# Patient Record
Sex: MALE | HISPANIC OR LATINO | Employment: FULL TIME | URBAN - METROPOLITAN AREA
[De-identification: names, ages, dates, MRNs, and addresses within clinical notes are randomized per-mention and may not be internally consistent; named-entity substitution may affect disease eponyms.]

---

## 2018-01-08 ENCOUNTER — HOSPITAL ENCOUNTER (EMERGENCY)
Facility: CLINIC | Age: 53
Discharge: HOME | End: 2018-01-08
Attending: EMERGENCY MEDICINE | Admitting: EMERGENCY MEDICINE
Payer: COMMERCIAL

## 2018-01-08 ENCOUNTER — APPOINTMENT (OUTPATIENT)
Dept: GENERAL RADIOLOGY | Facility: CLINIC | Age: 53
End: 2018-01-08
Attending: EMERGENCY MEDICINE
Payer: COMMERCIAL

## 2018-01-08 VITALS
WEIGHT: 163 LBS | SYSTOLIC BLOOD PRESSURE: 142 MMHG | RESPIRATION RATE: 18 BRPM | DIASTOLIC BLOOD PRESSURE: 78 MMHG | OXYGEN SATURATION: 96 % | TEMPERATURE: 98.6 F

## 2018-01-08 DIAGNOSIS — J18.9 PNEUMONIA OF RIGHT LUNG DUE TO INFECTIOUS ORGANISM, UNSPECIFIED PART OF LUNG: ICD-10-CM

## 2018-01-08 LAB
FLUAV+FLUBV AG SPEC QL: NEGATIVE
FLUAV+FLUBV AG SPEC QL: NEGATIVE
SPECIMEN SOURCE: NORMAL

## 2018-01-08 PROCEDURE — 71046 X-RAY EXAM CHEST 2 VIEWS: CPT

## 2018-01-08 PROCEDURE — 25000132 ZZH RX MED GY IP 250 OP 250 PS 637: Performed by: EMERGENCY MEDICINE

## 2018-01-08 PROCEDURE — 99283 EMERGENCY DEPT VISIT LOW MDM: CPT | Mod: Z6 | Performed by: EMERGENCY MEDICINE

## 2018-01-08 PROCEDURE — 99283 EMERGENCY DEPT VISIT LOW MDM: CPT | Mod: 25 | Performed by: EMERGENCY MEDICINE

## 2018-01-08 PROCEDURE — 87804 INFLUENZA ASSAY W/OPTIC: CPT | Mod: 91 | Performed by: FAMILY MEDICINE

## 2018-01-08 RX ORDER — IBUPROFEN 800 MG/1
800 TABLET, FILM COATED ORAL EVERY 8 HOURS PRN
Qty: 60 TABLET | Refills: 0 | Status: SHIPPED | OUTPATIENT
Start: 2018-01-08 | End: 2018-01-16

## 2018-01-08 RX ORDER — LEVOFLOXACIN 500 MG/1
500 TABLET, FILM COATED ORAL DAILY
Qty: 7 TABLET | Refills: 0 | Status: SHIPPED | OUTPATIENT
Start: 2018-01-08 | End: 2018-01-15

## 2018-01-08 RX ORDER — IBUPROFEN 600 MG/1
600 TABLET, FILM COATED ORAL ONCE
Status: COMPLETED | OUTPATIENT
Start: 2018-01-08 | End: 2018-01-08

## 2018-01-08 RX ADMIN — IBUPROFEN 600 MG: 600 TABLET ORAL at 02:36

## 2018-01-08 ASSESSMENT — ENCOUNTER SYMPTOMS
WHEEZING: 0
SHORTNESS OF BREATH: 1
RHINORRHEA: 1
COUGH: 1
SORE THROAT: 0
DIAPHORESIS: 0
MYALGIAS: 1
HEADACHES: 1
WEIGHT LOSS: 0
SINUS CONGESTION: 1
EYE DISCHARGE: 0
CHILLS: 1
FEVER: 1

## 2018-01-08 NOTE — ED AVS SNAPSHOT
[facility], [place], Emergency Department    [address]    [facility] [place] [address]    Phone:  [phone]    Fax:  [phone]                                       Michael Nazario   MRN: [id number]    Department:  [facility], Emergency Department   Date of Visit:  1/8/2018           After Visit Summary Signature Page     I have received my discharge instructions, and my questions have been answered. I have discussed any challenges I see with this plan with the nurse or doctor.    ..........................................................................................................................................  Patient/Patient Representative Signature      ..........................................................................................................................................  Patient Representative Print Name and Relationship to Patient    ..................................................               ................................................  Date                                            Time    ..........................................................................................................................................  Reviewed by Signature/Title    ...................................................              ..............................................  Date                                                            Time

## 2018-01-08 NOTE — ED AVS SNAPSHOT
[facility], Emergency Department    [address]    [facility] [address]    Phone:  [phone]    Fax:  [phone]                                       Michael Nazario   MRN: [id number]    Department:  [facility], Emergency Department   Date of Visit:  1/8/2018           Patient Information     Date Of Birth          1965        Your diagnoses for this visit were:     Pneumonia of right lung due to infectious organism, unspecified part of lung        You were seen by Poli Pickering MD.      Follow-up Information     Follow up with [facility] In 2 days.    Specialty:  Clinic    Contact information:    [address]  [place] [address]  [phone]          Discharge Instructions         Pneumonia (Adult)  Pneumonia is an infection deep within the lungs. It is in the small air sacs (alveoli). Pneumonia may be caused by a virus or bacteria. Pneumonia caused by bacteria is usually treated with an antibiotic. Severe cases may need to be treated in the hospital. Milder cases can be treated at home. Symptoms usually start to get better during the first 2 days of treatment.    Home care  Follow these guidelines when caring for yourself at home:    Rest at home for the first 2 to 3 days, or until you feel stronger. Don t let yourself get overly tired when you go back to your activities.    Stay away from cigarette smoke - yours or other people s.    You may use acetaminophen or ibuprofen to control fever or pain, unless another medicine was prescribed. If you have chronic liver or kidney disease, talk with your healthcare provider before using these medicines. Also talk with your provider if you ve had a stomach ulcer or gastrointestinal bleeding. Don t give aspirin to anyone younger than 18 years of age who is ill with a fever. It may cause severe liver damage.    Your appetite may be poor, so a light diet is fine.    Drink 6 to 8 glasses of fluids every day to make sure you  are getting enough fluids. Beverages can include water, sport drinks, sodas without caffeine, juices, tea, or soup. Fluids will help loosen secretions in the lung. This will make it easier for you to cough up the phlegm (sputum). If you also have heart or kidney disease, check with your healthcare provider before you drink extra fluids.    Take antibiotic medicine prescribed until it is all gone, even if you are feeling better after a few days.  Follow-up care  Follow up with your healthcare provider in the next 2 to 3 days, or as advised. This is to be sure the medicine is helping you get better.  If you are 65 or older, you should get a pneumococcal vaccine and a yearly flu (influenza) shot. You should also get these vaccines if you have chronic lung disease like asthma, emphysema, or COPD. Recently, a second type of pneumonia vaccine has become available for everyone over 65 years old. This is in addition to the previous vaccine. Ask your provider about this.  When to seek medical advice  Call your healthcare provider right away if any of these occur:    You don t get better within the first 48 hours of treatment    Shortness of breath gets worse    Rapid breathing (more than 25 breaths per minute)    Coughing up blood    Chest pain gets worse with breathing    Fever of 100.4 F (38 C) or higher that doesn t get better with fever medicine    Weakness, dizziness, or fainting that gets worse    Thirst or dry mouth that gets worse    Sinus pain, headache, or a stiff neck    Chest pain not caused by coughing  Date Last Reviewed: 1/1/2017 2000-2017 The "[employer] [address] [place] [address], [place] [address]. All rights reserved. This information is not intended as a substitute for professional medical care. Always follow your healthcare professional's instructions.          24 Hour Appointment Hotline       To make an appointment at any [facility], call [phone] ([phone]). If you don't  have a family doctor or clinic, we will help you find one. [facility] clinics are conveniently located to serve the needs of you and your family.             Review of your medicines      START taking        Dose / Directions Last dose taken    ibuprofen 800 MG tablet   Commonly known as:  ADVIL/MOTRIN   Dose:  800 mg   Quantity:  60 tablet        Take 1 tablet (800 mg) by mouth every 8 hours as needed for moderate pain   Refills:  0        levofloxacin 500 MG tablet   Commonly known as:  LEVAQUIN   Dose:  500 mg   Quantity:  7 tablet        Take 1 tablet (500 mg) by mouth daily for 7 days   Refills:  0                Prescriptions were sent or printed at these locations (2 Prescriptions)                   Other Prescriptions                Printed at Department/Unit printer (2 of 2)         ibuprofen (ADVIL/MOTRIN) 800 MG tablet               levofloxacin (LEVAQUIN) 500 MG tablet                Procedures and tests performed during your visit     Influenza A/B antigen swab    XR Chest 2 Views      Orders Needing Specimen Collection     None      Pending Results     No orders found from 1/6/2018 to 1/9/2018.            Pending Culture Results     No orders found from 1/6/2018 to 1/9/2018.            Pending Results Instructions     If you had any lab results that were not finalized at the time of your Discharge, you can call the ED Lab Result RN at [phone]. You will be contacted by this team for any positive Lab results or changes in treatment. The nurses are available 7 days a week from 10A to 6:30P.  You can leave a message 24 hours per day and they will return your call.        Thank you for choosing [facility]       Thank you for choosing [facility] for your care. Our goal is always to provide you with excellent care. Hearing back from our patients is one way we can continue to improve our services. Please take a few minutes to complete the written survey that you may receive in the mail after you visit with us.  "Thank you!        [employer] Information     [employer] lets you send messages to your doctor, view your test results, renew your prescriptions, schedule appointments and more. To sign up, go to [email or web address] . Click on \"Log in\" on the left side of the screen, which will take you to the Welcome page. Then click on \"Sign up Now\" on the right side of the page.     You will be asked to enter the access code listed below, as well as some personal information. Please follow the directions to create your username and password.     Your access code is: [email or web address]  Expires: 2018  3:25 AM     Your access code will  in 90 days. If you need help or a new code, please call your [facility] clinic or [phone].        Care EveryWhere ID     This is your Care EveryWhere ID. This could be used by other organizations to access your [facility] medical records  [id number]        Equal Access to Services     [facility] : Hadii gaurav gerardoo Sokalyn, waaxda luerinadaha, qaybta kaalmaarmen johnson, rafael vallecillo . So [facility] [phone].    ATENCIÓN: Si habla español, tiene a leos disposición servicios gratuitos de asistencia lingüística. Llame al [phone].    We comply with applicable federal civil rights laws and [place] laws. We do not discriminate on the basis of race, color, national origin, age, disability, sex, sexual orientation, or gender identity.            After Visit Summary       This is your record. Keep this with you and show to your community pharmacist(s) and doctor(s) at your next visit.                  "

## 2018-01-08 NOTE — LETTER
Return to  Work Release    Date: 1/8/2018      Name: Michael Nazario                       YOB: 1965    Medical Record Number: [id number]    The patient was seen at: [facility]    Restrictions if any: None    Resume Activity: In 3 days.        _________________________  Poli Pickering MD

## 2018-01-08 NOTE — ED PROVIDER NOTES
History     Chief Complaint   Patient presents with     Flu Symptoms     Per patient, began approximately 7 day ago with nasal congestion and cough     Patient is a 52 year old male presenting with cough. The history is provided by the patient. No  was used.   Cough   Cough characteristics:  Non-productive, dry and hacking  Sputum characteristics:  Unable to specify  Severity:  Moderate  Onset quality:  Gradual  Timing:  Constant  Progression:  Worsening  Chronicity: 5 days.  Smoker: no    Context: upper respiratory infection    Context: not sick contacts    Worsened by:  Activity  Ineffective treatments:  Decongestant and cough suppressants  Associated symptoms: chills, fever, headaches, myalgias, rhinorrhea, shortness of breath ( fro 1 day) and sinus congestion    Associated symptoms: no chest pain, no diaphoresis, no ear pain, no eye discharge, no rash, no sore throat, no weight loss and no wheezing    Fever:     Duration:  5 weeks    Temp source:  Lucia Nazario is a 52 year old male who cough and sob    I have reviewed the Medications, Allergies, Past Medical and Surgical History, and Social History in the Epic system.    Review of Systems   Constitutional: Positive for chills and fever. Negative for diaphoresis and weight loss.   HENT: Positive for rhinorrhea. Negative for ear pain and sore throat.    Eyes: Negative for discharge.   Respiratory: Positive for cough and shortness of breath ( fro 1 day). Negative for wheezing.    Cardiovascular: Negative for chest pain.   Musculoskeletal: Positive for myalgias.   Skin: Negative for rash.   Neurological: Positive for headaches.   All other systems reviewed and are negative.      Physical Exam   BP: 142/78  Heart Rate: 93  Temp: 98.6  F (37  C)  Resp: 18  Weight: 73.9 kg (163 lb)  SpO2: 96 %      Physical Exam   Constitutional: He is oriented to person, place, and time. He appears well-developed and well-nourished. No distress.    HENT:   Head: Normocephalic and atraumatic.   Right Ear: External ear normal.   Left Ear: External ear normal.   Mouth/Throat: Oropharynx is clear and moist.   Eyes: Conjunctivae are normal. Pupils are equal, round, and reactive to light. Right eye exhibits no discharge. Left eye exhibits no discharge. No scleral icterus.   Neck: Normal range of motion.   Cardiovascular: Normal rate, regular rhythm, normal heart sounds and intact distal pulses.    No murmur heard.  Pulmonary/Chest: Effort normal. No stridor. No respiratory distress. He has no wheezes. Rales:  right base. He exhibits no tenderness.   Abdominal: Soft. There is no tenderness.   Musculoskeletal: Normal range of motion. He exhibits no edema.   Lymphadenopathy:     He has no cervical adenopathy.   Neurological: He is alert and oriented to person, place, and time. Coordination normal.   Skin: Skin is warm and dry.   Psychiatric: He has a normal mood and affect.   Nursing note and vitals reviewed.      ED Course     ED Course   DDX influenza, uri, bronchitis, sinusititis, marline, pna  Iibuprofen, influenza and cxr order.  ibu with improved sxs  Influenza negative  cxr with RLL infilltrate      Procedures          Labs Ordered and Resulted from Time of ED Arrival Up to the Time of Departure from the ED   INFLUENZA A/B ANTIGEN            Assessments & Plan (with Medical Decision Making)   pnuemonia  Home with levaquin, ibuprofen and close outpt fu with pmd returning sooner if worse, particularly worsening sob    I have reviewed the nursing notes.    I have reviewed the findings, diagnosis, plan and need for follow up with the patient.    New Prescriptions    IBUPROFEN (ADVIL/MOTRIN) 800 MG TABLET    Take 1 tablet (800 mg) by mouth every 8 hours as needed for moderate pain    LEVOFLOXACIN (LEVAQUIN) 500 MG TABLET    Take 1 tablet (500 mg) by mouth daily for 7 days       Final diagnoses:   Pneumonia of right lung due to infectious organism, unspecified part of  lung       1/8/2018   [facility], [place], EMERGENCY DEPARTMENT     Poli Pickering MD  01/08/18 0312

## 2018-01-08 NOTE — DISCHARGE INSTRUCTIONS

## 2021-05-17 ENCOUNTER — APPOINTMENT (OUTPATIENT)
Dept: ULTRASOUND IMAGING | Facility: CLINIC | Age: 56
End: 2021-05-17
Attending: EMERGENCY MEDICINE
Payer: COMMERCIAL

## 2021-05-17 ENCOUNTER — HOSPITAL ENCOUNTER (EMERGENCY)
Facility: CLINIC | Age: 56
Discharge: HOME | End: 2021-05-17
Attending: EMERGENCY MEDICINE | Admitting: EMERGENCY MEDICINE
Payer: COMMERCIAL

## 2021-05-17 VITALS
DIASTOLIC BLOOD PRESSURE: 88 MMHG | OXYGEN SATURATION: 98 % | TEMPERATURE: 97.6 F | RESPIRATION RATE: 16 BRPM | SYSTOLIC BLOOD PRESSURE: 142 MMHG | HEART RATE: 67 BPM

## 2021-05-17 DIAGNOSIS — L73.9 FOLLICULITIS: ICD-10-CM

## 2021-05-17 DIAGNOSIS — B35.6 TINEA CRURIS: ICD-10-CM

## 2021-05-17 LAB — GLUCOSE BLDC GLUCOMTR-MCNC: 102 MG/DL (ref 70–99)

## 2021-05-17 PROCEDURE — 76870 US EXAM SCROTUM: CPT

## 2021-05-17 PROCEDURE — 93976 VASCULAR STUDY: CPT

## 2021-05-17 PROCEDURE — 999N001017 HC STATISTIC GLUCOSE BY METER IP

## 2021-05-17 PROCEDURE — 10060 I&D ABSCESS SIMPLE/SINGLE: CPT | Performed by: EMERGENCY MEDICINE

## 2021-05-17 PROCEDURE — 87070 CULTURE OTHR SPECIMN AEROBIC: CPT | Performed by: EMERGENCY MEDICINE

## 2021-05-17 PROCEDURE — 87186 SC STD MICRODIL/AGAR DIL: CPT | Performed by: EMERGENCY MEDICINE

## 2021-05-17 PROCEDURE — 99284 EMERGENCY DEPT VISIT MOD MDM: CPT | Mod: 25 | Performed by: EMERGENCY MEDICINE

## 2021-05-17 PROCEDURE — 87077 CULTURE AEROBIC IDENTIFY: CPT | Performed by: EMERGENCY MEDICINE

## 2021-05-17 PROCEDURE — 250N000009 HC RX 250

## 2021-05-17 RX ORDER — LIDOCAINE HYDROCHLORIDE AND EPINEPHRINE 10; 10 MG/ML; UG/ML
5 INJECTION, SOLUTION INFILTRATION; PERINEURAL ONCE
Status: DISCONTINUED | OUTPATIENT
Start: 2021-05-17 | End: 2021-05-17 | Stop reason: HOSPADM

## 2021-05-17 RX ORDER — CALCIUM CARB/VITAMIN D3/VIT K1 500-100-40
TABLET,CHEWABLE ORAL 2 TIMES DAILY
Qty: 113 G | Refills: 1 | Status: SHIPPED | OUTPATIENT
Start: 2021-05-17 | End: 2021-05-31

## 2021-05-17 RX ORDER — CLINDAMYCIN HCL 300 MG
300 CAPSULE ORAL 4 TIMES DAILY
Qty: 40 CAPSULE | Refills: 0 | Status: SHIPPED | OUTPATIENT
Start: 2021-05-17 | End: 2021-05-27

## 2021-05-17 ASSESSMENT — ENCOUNTER SYMPTOMS
ENDOCRINE NEGATIVE: 1
FEVER: 0
CHILLS: 0

## 2021-05-17 NOTE — DISCHARGE INSTRUCTIONS
Take clindamycin as prescribed for 10 days for your scrotal swelling and tenderness.  Make sure to keep the area clean - you can wash it in the shower per your normal routine.     Please follow up with your Primary care doctor in 1 week for recheck.  Seek medical attention if increasing swelling, spreading skin redness, worsening pain, fever.     For the itching in your scrotal area, try jock itch treatment (can be powders, sprays).  You can buy these at [employer].  Ask the pharmacist there for help.

## 2021-05-17 NOTE — ED PROVIDER NOTES
[facility] EMERGENCY DEPARTMENT ([facility])   May 17, 2021  ED 20   History     Chief Complaint   Patient presents with     Abscess     The history is provided by the patient and medical records.     Michael Nazario is a 55 year old male who presents for evaluation of possible abscess on his scrotum.  He states that he noticed a bump on his scrotum last week.  He has been cleaning this area with an alcohol wipe and applying hydrocortisone cream on it thinking it'd help it heal.  Last night he noticed that the bump was starting to come to the surface with skin thinning and this morning it opened up with greenish discharge.  He now presents for evaluation.  No history of diabetes.  He is not on any systemic steroids or immunosuppressants.  He has never had an abscess on his scrotum before. No f/c. He says he has hx of gonorrhea in the past     PAST MEDICAL HISTORY: History reviewed. No pertinent past medical history.    PAST SURGICAL HISTORY: History reviewed. No pertinent surgical history.    Past medical history, past surgical history, medications, and allergies were reviewed with the patient. Additional pertinent items: None    FAMILY HISTORY: No family history on file.    SOCIAL HISTORY:   Social History     Tobacco Use     Smoking status: Never Smoker     Smokeless tobacco: Never Used   Substance Use Topics     Alcohol use: No     Social history was reviewed with the patient. Additional pertinent items: None      Discharge Medication List as of 5/17/2021  1:17 PM      START taking these medications    Details   clindamycin (CLEOCIN) 300 MG capsule Take 1 capsule (300 mg) by mouth 4 times daily for 10 days, Disp-40 capsule, R-0, Local Print      tolnaftate (LAMISIL) 1 % external spray Externally apply topically 2 times daily for 14 days Use for scrotal itching.Disp-113 g, R-1Local Print              No Known Allergies     Review of Systems   Constitutional: Negative for chills and fever.   Endocrine:  Negative.    Genitourinary: Negative for discharge, penile pain, penile swelling, scrotal swelling and testicular pain.        Abscess on scrotum   Skin: Positive for rash.   All other systems reviewed and are negative.    A complete review of systems was performed with pertinent positives and negatives noted in the HPI, and all other systems negative.    Physical Exam   BP: 139/79  Pulse: 64  Temp: 96.9  F (36.1  C)  Resp: 16  SpO2: 97 %      Physical Exam  Vitals signs and nursing note reviewed.   HENT:      Head: Normocephalic and atraumatic.      Nose:      Comments: Mask on  Eyes:      Extraocular Movements: Extraocular movements intact.   Neck:      Musculoskeletal: Normal range of motion.   Cardiovascular:      Rate and Rhythm: Normal rate.   Pulmonary:      Effort: Pulmonary effort is normal.   Genitourinary:         Comments: 1.5 cm area of induration with central opening draining pus.  No surrounding cellulitis  Musculoskeletal: Normal range of motion.   Skin:     General: Skin is warm and dry.   Neurological:      General: No focal deficit present.      Mental Status: He is alert and oriented to person, place, and time.   Psychiatric:         Mood and Affect: Mood normal.         Behavior: Behavior normal.         Thought Content: Thought content normal.         Judgment: Judgment normal.         ED Course        Procedures           The medical record was reviewed and interpreted.  Current images reviewed and interpreted: no abscess.     lab reviewed and interpreted.                Results for orders placed or performed during the hospital encounter of 05/17/21 (from the past 24 hour(s))   Glucose by meter   Result Value Ref Range    Glucose 102 (H) 70 - 99 mg/dL   Wound Culture Aerobic Bacterial    Specimen: Scrotum    Boil   Result Value Ref Range    Specimen Description Scrotum Boil     Special Requests Specimen collected in eSwab transport (white cap)     Culture Micro PENDING    US Testicular &  Scrotum w Doppler [facility]    Narrative    US TESTICULAR AND SCROTUM WITH DOPPLER LIMITED 5/17/2021 12:24 PM    CLINICAL HISTORY: Scrotal swelling. History of boil.  TECHNIQUE: Ultrasound of scrotum with color flow and spectral Doppler  with waveform analysis performed.    COMPARISON: None.    FINDINGS:    RIGHT: Right testicle measures 5.3 x 2.8 x 2.5 cm. Normal testicle  with no masses. Normal arterial duplex and normal color flow. Single  intratesticular microcalcification acquiring no specific follow-up.  Epididymal head cyst measuring 0.4 x 1.2 x 0.4 cm and otherwise normal  epididymis. No hydrocele. No varicocele.    LEFT: Left testicle measures 5.2 x 2.8 x 2.2 cm. Normal testicle with  no masses. Normal arterial duplex and normal color flow. Normal  epididymis. Trace hydrocele. No varicocele.      Impression    IMPRESSION:  1.  No intratesticular masses, testicular torsion or acute  epididymoorchitis.  2.  Small, 1.2 cm right epididymal head cyst.  3.  Trace left hydrocele.    JENY HA MD     Medications   lidocaine 1% with EPINEPHrine 1:100,000 injection 5 mL (has no administration in time range)             Assessments & Plan (with Medical Decision Making)   The patient denies hx of DM and presents with draining follicular abscess with no surrounding cellulitis.   Had to open further.   Injected with Lidocaine 1% with epi (1 ml).  Tried to blunt dissect and does not seem to track further but seems to hit wall of circular lesion.  He had scant pus noted.  He tolerated this well. No complications.  No active bleeding.   Will order ultrasound to identify further.  Also sent wound culture. Ultrasound upon review shows no fluid collection of concern.  I do not feel that there is any further area to I and D but rather inflammatory changes.  Asked Dr. DANIEL Urena for his opinion and he agrees.  Seems too medial to be a lymph node.  Will place on clindamycin for 10 days and pmd follow up.  PMD referral made given  he does not have a pmd.  Checked glucose and it is 102.  He also says he has scrotal itching in general, so prescribed lamisil for that.  We discussed reasons to return including increasing in size, skin redness, fevers, worsening pain.      I have reviewed the nursing notes.    I have reviewed the findings, diagnosis, plan and need for follow up with the patient.    Discharge Medication List as of 5/17/2021  1:17 PM      START taking these medications    Details   clindamycin (CLEOCIN) 300 MG capsule Take 1 capsule (300 mg) by mouth 4 times daily for 10 days, Disp-40 capsule, R-0, Local Print      tolnaftate (LAMISIL) 1 % external spray Externally apply topically 2 times daily for 14 days Use for scrotal itching.Disp-113 g, R-1Local Print             Final diagnoses:   Folliculitis   Tinea cruris   I, Nancy Cai, am serving as a trained medical scribe to document services personally performed by Carmen Vizcarra MD based on the provider's statements to me on May 17, 2021.  This document has been checked and approved by the attending provider.    I, Carmen Vizcarra MD, was physically present and have reviewed and verified the accuracy of this note documented by Nancy Cai, medical scribe.       5/17/2021   [facility] EMERGENCY DEPARTMENT     Carmen Vizcarra MD  05/17/21 4826

## 2021-05-17 NOTE — ED TRIAGE NOTES
Pt states he has a boil on his scrotum and was seen at the clinic and told to come to the ED for furhter evaluation.

## 2021-05-17 NOTE — ED NOTES
"Pt states he has a boil on his scrotum. Says it has been there about three weeks and that it opened up last night.   Pt says some \"green stuff\" came out, not leaking currently.   "

## 2021-05-18 NOTE — RESULT ENCOUNTER NOTE
[facility] ED discharge antibiotic (if prescribed):  Clindamycin (Cleocin) 300 mg PO capsule, 1 capsule (300 mg) by mouth 4 times daily for 10 days  Incision and Drainage performed in Emergency Dept [Yes / No] : Yes  No changes in treatment per [facility] ED lab result culture protocol.

## 2021-05-19 LAB
BACTERIA SPEC CULT: ABNORMAL
BACTERIA SPEC CULT: ABNORMAL
Lab: ABNORMAL
SPECIMEN SOURCE: ABNORMAL

## 2021-05-20 ENCOUNTER — TELEPHONE (OUTPATIENT)
Dept: EMERGENCY MEDICINE | Facility: CLINIC | Age: 56
End: 2021-05-20

## 2021-05-20 NOTE — TELEPHONE ENCOUNTER
[facility] Emergency Department Lab result notification:    Reason for call  Assess and inform pt of lab result    Lab Result  Final Wound culture (Scrotum) report on 5/20/21.  [facility] Emergency Dept discharge antibiotic prescribed: Clindamycin (Cleocin) 300 mg PO capsule, 1 capsule (300 mg) by mouth 4 times daily for 10 days  #1. Bacteria, Heavy growth Methicillin resistant Staphylococcus aureus (MRSA) , which is [SUSCEPTIBLE] to antibiotic   #2. Bacteria, Moderate growth Staphylococcus epidermidis Susceptibility testing not routinely done   Incision and Drainage performed in Emergency Dept [Yes / No]: Yes  Recommendations in treatment per [facility] ED lab result Culture protocol.  ED visit Date: 5/17/21  Symptoms reported at ED visit The patient denies hx of DM and presents with draining follicular abscess with no surrounding cellulitis.   Had to open further.   Injected with Lidocaine 1% with epi (1 ml).  Tried to blunt dissect and does not seem to track further but seems to hit wall of circular lesion.  He had scant pus noted.  He tolerated this well. No complications.  No active bleeding.   Will order ultrasound to identify further.  Also sent wound culture. Ultrasound upon review shows no fluid collection of concern.  I do not feel that there is any further area to I and D but rather inflammatory changes.  Asked Dr. DANIEL Urena for his opinion and he agrees.  Seems too medial to be a lymph node.  Will place on clindamycin for 10 days and pmd follow up.  PMD referral made given he does not have a pmd.  Checked glucose and it is 102.  He also says he has scrotal itching in general, so prescribed lamisil for that.  We discussed reasons to return including increasing in size, skin redness, fevers, worsening pain.     Miscellaneous information Final diagnoses:   Folliculitis   Tinea cruris     5/17/2021   [facility] EMERGENCY DEPARTMENT     Carmen Vizcarra MD  05/17/21 3384     Current  symptoms  10:20 am Message left to call us back at [phone], between 10 am and 6 pm, seven days a week. May leave a message 24/7, if no one available.     Dixie Anne RN  [facility]  Emergency Dept Lab Result RN  Ph# [phone]

## 2021-05-21 NOTE — TELEPHONE ENCOUNTER
[employer]  Emergency Department/Urgent Care Lab result notification     Patient/parent Name  Michael    RN Assessment (Patient s current Symptoms), include time called.  [Insert Left message here if message left]  9:04  Patient states that it is healing okay, patient confirms improving, pain is minimal, swelling is decreased, and no drainage. No fever.   Does have some diarrhea just happened yesterday a little . Not sure if it is from the antibiotic or something he ate.   At one time yesterday he felt a pinch in the other testicle. But has not felt it since.  He will continue to take medication as prescribed.  Did encourage follow up with in one week with a provider as per AVS and did encourage use of [employer] [facility]. Patient stated understanding.      RN Recommendations/Instructions per [place] ED lab result protocol  Patient notified of lab result and treatment recommendations.    RN reviewed information about MRSA Patient Education:  Prevention in the community -- The best way to prevent and control MRSA in the community is not clear. The Center for Disease Control and Prevention has made the following recommendations [4]:    Keep hands clean by washing thoroughly with soap and water. Hands should be wet with water and plain soap, and rubbed together for 15 to 30 seconds. Special attention should be paid to the fingernails, between the fingers, and the wrists. Hands should be rinsed thoroughly, and dried with a single use towel (eg, paper towels).    Alcohol-based hand sanitizers are a good alternative for disinfecting hands if a sink is not available. Hand sanitizers should be rubbed over the entire surface of hands, fingers, and wrists until dry, and may be used several times. Hand sanitizers are available as a liquid or wipe in small, portable sizes that are easy to carry in a pocket or handbag. When a sink is available, visibly soiled hands should be washed with soap and water.    Keep cuts and  scrapes clean, dry, and covered with a bandage until healed.    Use of disinfectant (antimicrobial cleaning agent) on surfaces (eg, counters, door knobs, phones, computer keyboards) can help to reduce or eliminate bacteria.       Please Contact your PCP clinic or return to the Emergency department if your:    Symptoms return.    Symptoms do not resolve after completing antibiotic.    Symptoms worsen or other concerning symptom's.        Estefanía Steinberg  [facility]  Emergency Dept Lab Result RN  Ph# [phone]

## 2021-07-12 ENCOUNTER — APPOINTMENT (OUTPATIENT)
Dept: GENERAL RADIOLOGY | Facility: CLINIC | Age: 56
End: 2021-07-12
Attending: EMERGENCY MEDICINE
Payer: COMMERCIAL

## 2021-07-12 ENCOUNTER — HOSPITAL ENCOUNTER (EMERGENCY)
Facility: CLINIC | Age: 56
Discharge: HOME | End: 2021-07-12
Attending: EMERGENCY MEDICINE | Admitting: EMERGENCY MEDICINE
Payer: COMMERCIAL

## 2021-07-12 VITALS
RESPIRATION RATE: 16 BRPM | OXYGEN SATURATION: 99 % | DIASTOLIC BLOOD PRESSURE: 90 MMHG | SYSTOLIC BLOOD PRESSURE: 154 MMHG | TEMPERATURE: 98.1 F | HEART RATE: 79 BPM

## 2021-07-12 DIAGNOSIS — J02.9 SORE THROAT: ICD-10-CM

## 2021-07-12 DIAGNOSIS — J40 BRONCHITIS: ICD-10-CM

## 2021-07-12 DIAGNOSIS — R05.9 COUGH: ICD-10-CM

## 2021-07-12 DIAGNOSIS — Z20.822 COVID-19 RULED OUT BY LABORATORY TESTING: ICD-10-CM

## 2021-07-12 LAB
DEPRECATED S PYO AG THROAT QL EIA: NEGATIVE
GROUP A STREP BY PCR: NOT DETECTED
SARS-COV-2 RNA RESP QL NAA+PROBE: NEGATIVE

## 2021-07-12 PROCEDURE — C9803 HOPD COVID-19 SPEC COLLECT: HCPCS | Performed by: EMERGENCY MEDICINE

## 2021-07-12 PROCEDURE — 99284 EMERGENCY DEPT VISIT MOD MDM: CPT | Mod: 25 | Performed by: EMERGENCY MEDICINE

## 2021-07-12 PROCEDURE — 87635 SARS-COV-2 COVID-19 AMP PRB: CPT | Performed by: EMERGENCY MEDICINE

## 2021-07-12 PROCEDURE — 99284 EMERGENCY DEPT VISIT MOD MDM: CPT | Performed by: EMERGENCY MEDICINE

## 2021-07-12 PROCEDURE — 71045 X-RAY EXAM CHEST 1 VIEW: CPT

## 2021-07-12 PROCEDURE — 87880 STREP A ASSAY W/OPTIC: CPT | Performed by: EMERGENCY MEDICINE

## 2021-07-12 PROCEDURE — 87651 STREP A DNA AMP PROBE: CPT | Performed by: EMERGENCY MEDICINE

## 2021-07-12 RX ORDER — AZITHROMYCIN 250 MG/1
TABLET, FILM COATED ORAL
Qty: 6 TABLET | Refills: 0 | Status: SHIPPED | OUTPATIENT
Start: 2021-07-12 | End: 2021-07-17

## 2021-07-12 NOTE — ED TRIAGE NOTES
C/o sore throat and productive cough two weeks, also headache, dry eyes. taking tylenol, asprin, cough medicine. Pt states sputum is green.

## 2021-07-12 NOTE — DISCHARGE INSTRUCTIONS
TODAY'S VISIT:  You were seen today for cough, sore throat  -   - If you had any labs or imaging/radiology tests performed today, you should also discuss these tests with your usual provider.     FOLLOW-UP:  Please make an appointment to follow up with:  - Your Primary Care Provider. If you do not have a PCP, please call the [facility] Care Center (phone: [phone] for an appointment    - Have your provider review the results from today's visit with you again to make sure no further follow-up or additional testing is needed based on those results.     PRESCRIPTIONS / MEDICATIONS:  - azithromycin     OTHER INSTRUCTIONS:  - make sure to stay well hydrated    RETURN TO THE EMERGENCY DEPARTMENT  Return to the Emergency Department at any time for any new or worsening symptoms or any concerns.

## 2021-07-12 NOTE — RESULT ENCOUNTER NOTE
Group A Streptococcus PCR is NEGATIVE  No treatment or change in treatment [facility] ED lab result Strep Group A protocol.

## 2023-03-17 ENCOUNTER — HOSPITAL ENCOUNTER (EMERGENCY)
Facility: CLINIC | Age: 58
Discharge: HOME | End: 2023-03-17
Attending: EMERGENCY MEDICINE | Admitting: EMERGENCY MEDICINE
Payer: COMMERCIAL

## 2023-03-17 ENCOUNTER — APPOINTMENT (OUTPATIENT)
Dept: GENERAL RADIOLOGY | Facility: CLINIC | Age: 58
End: 2023-03-17
Attending: EMERGENCY MEDICINE
Payer: COMMERCIAL

## 2023-03-17 VITALS
OXYGEN SATURATION: 96 % | HEIGHT: 63 IN | DIASTOLIC BLOOD PRESSURE: 71 MMHG | SYSTOLIC BLOOD PRESSURE: 108 MMHG | WEIGHT: 166.4 LBS | HEART RATE: 69 BPM | TEMPERATURE: 98.2 F | RESPIRATION RATE: 18 BRPM | BODY MASS INDEX: 29.48 KG/M2

## 2023-03-17 DIAGNOSIS — J06.9 UPPER RESPIRATORY TRACT INFECTION, UNSPECIFIED TYPE: ICD-10-CM

## 2023-03-17 LAB
DEPRECATED S PYO AG THROAT QL EIA: NEGATIVE
FLUAV RNA SPEC QL NAA+PROBE: NEGATIVE
FLUBV RNA RESP QL NAA+PROBE: NEGATIVE
GROUP A STREP BY PCR: NOT DETECTED
RSV RNA SPEC NAA+PROBE: NEGATIVE
SARS-COV-2 RNA RESP QL NAA+PROBE: NEGATIVE

## 2023-03-17 PROCEDURE — 71046 X-RAY EXAM CHEST 2 VIEWS: CPT

## 2023-03-17 PROCEDURE — 87651 STREP A DNA AMP PROBE: CPT | Performed by: EMERGENCY MEDICINE

## 2023-03-17 PROCEDURE — 99283 EMERGENCY DEPT VISIT LOW MDM: CPT | Mod: CS | Performed by: EMERGENCY MEDICINE

## 2023-03-17 PROCEDURE — 87637 SARSCOV2&INF A&B&RSV AMP PRB: CPT | Performed by: EMERGENCY MEDICINE

## 2023-03-17 PROCEDURE — 99284 EMERGENCY DEPT VISIT MOD MDM: CPT | Mod: CS,25 | Performed by: EMERGENCY MEDICINE

## 2023-03-17 PROCEDURE — C9803 HOPD COVID-19 SPEC COLLECT: HCPCS | Performed by: EMERGENCY MEDICINE

## 2023-03-17 RX ORDER — DEXAMETHASONE 2 MG/1
6 TABLET ORAL ONCE
Status: COMPLETED | OUTPATIENT
Start: 2023-03-17 | End: 2023-03-17

## 2023-03-17 ASSESSMENT — ACTIVITIES OF DAILY LIVING (ADL): ADLS_ACUITY_SCORE: 33

## 2023-03-17 NOTE — ED TRIAGE NOTES
Patient c/o cough, nasal congestion, shortness of breath with exertion, sore throat, and headache. Onset 6-7 days ago. Patient also said he had  diarrhea, nausea, and vomiting on Tuesday and Wed.      Triage Assessment     Row Name 03/17/23 0250       Triage Assessment (Adult)    Airway WDL WDL       Respiratory WDL    Respiratory WDL X;cough  reported nasal congestion, shortness of breath with exertion    Cough Frequency infrequent    Cough Type loose;good       Skin Circulation/Temperature WDL    Skin Circulation/Temperature WDL WDL       Cardiac WDL    Cardiac WDL WDL       Peripheral/Neurovascular WDL    Peripheral Neurovascular WDL WDL       Cognitive/Neuro/Behavioral WDL    Cognitive/Neuro/Behavioral WDL WDL

## 2023-03-17 NOTE — ED PROVIDER NOTES
"ED Provider Note  [facility]      History     Chief Complaint   Patient presents with     Flu Symptoms     HPI  Michael Nzaario is a 57 year old male who is presenting with cough, nasal congestion, sore throat.  This been going on for about a week.  Had a day of diarrhea and vomiting which is resolved.  He has no abdominal pain.  Denies chest pain.  Denies shortness of breath to myself despite saying this to the triage nurse.  He states he mainly meant he has difficulty breathing through his nose.  Denies cardiac issues.  No diaphoresis.  Denies leg pain or swelling.  He has not taken his temperature at home.  He has been trying throat lozenges for his sore throat.  He is eating and drink without difficulty and swallowing without difficulty.  No change in voice.  He decided to come in tonight to try to feel better to go back to work in 2 days.    Past Medical History  History reviewed. No pertinent past medical history.  History reviewed. No pertinent surgical history.  No current outpatient medications on file.    No Known Allergies  Family History  History reviewed. No pertinent family history.  Social History   Social History     Tobacco Use     Smoking status: Never     Smokeless tobacco: Never   Substance Use Topics     Alcohol use: Yes     Alcohol/week: 2.0 standard drinks     Types: 2 Cans of beer per week     Comment: 5 days a week     Drug use: No      Past medical history, past surgical history, medications, allergies, family history, and social history were reviewed with the patient. No additional pertinent items.      A medically appropriate review of systems was performed with pertinent positives and negatives noted in the HPI, and all other systems negative.    Physical Exam   BP: 108/71  Pulse: 69  Temp: 98.2  F (36.8  C)  Resp: 18  Height: 160 cm (5' 3\")  Weight: 75.5 kg (166 lb 6.4 oz)  SpO2: 96 %  Physical Exam  Physical Exam   Constitutional: oriented to person, place, " and time. appears well-developed and well-nourished.   HENT:   Head: Normocephalic and atraumatic. Uvula midline.  No peritonsillar swelling.  Floor of mouth soft.  No signs of any erythema or tenderness.  No cervical adenopathy.  Neck: Normal range of motion. No nuchal rigidity.  Pulmonary/Chest: Effort normal. No respiratory distress. Clear lungs bilaterally.  Cardiac: No murmurs, rubs, gallops. RRR.  Abdominal: Abdomen soft, nontender, nondistended. No rebound tenderness.  MSK: Long bones without deformity or evidence of trauma  Neurological: alert and oriented to person, place, and time.   Skin: Skin is warm and dry.   Psychiatric:  normal mood and affect.  behavior is normal. Thought content normal.       ED Course, Procedures, & Data      Procedures              Results for orders placed or performed during the hospital encounter of 03/17/23   XR Chest 2 Views     Status: None    Narrative    EXAM: XR CHEST 2 VIEWS  LOCATION: [facility]  DATE/TIME: 3/17/2023 3:51 AM    INDICATION: cough  COMPARISON: 07/12/2021.      Impression    IMPRESSION: Negative chest.   Symptomatic Influenza A/B, RSV, & SARS-CoV2 PCR (COVID-19) Nasopharyngeal     Status: Normal    Specimen: Nasopharyngeal; Swab   Result Value Ref Range    Influenza A PCR Negative Negative    Influenza B PCR Negative Negative    RSV PCR Negative Negative    SARS CoV2 PCR Negative Negative    Narrative    Testing was performed using the Xpert Xpress CoV2/Flu/RSV Assay on the [employer] GeneXpert Instrument. This test should be ordered for the detection of SARS-CoV-2, influenza, and RSV viruses in individuals who meet clinical and/or epidemiological criteria. Test performance is unknown in asymptomatic patients. This test is for in vitro diagnostic use under the FDA EUA for laboratories certified under CLIA to perform high or moderate complexity testing. This test has not been FDA cleared or approved. A negative  result does not rule out the presence of PCR inhibitors in the specimen or target RNA in concentration below the limit of detection for the assay. If only one viral target is positive but coinfection with multiple targets is suspected, the sample should be re-tested with another FDA cleared, approved, or authorized test, if coinfection would change clinical management. This test was validated by the [facility]. These laboratories are certified under the Clinical Laboratory Improvement Amendments of 1988 (CLIA-88) as qualified to perform high complexity laboratory testing.   Streptococcus A Rapid Scr w Reflx to PCR     Status: Normal    Specimen: Throat; Swab   Result Value Ref Range    Group A Strep antigen Negative Negative     Medications - No data to display  Labs Ordered and Resulted from Time of ED Arrival to Time of ED Departure - No data to display  XR Chest 2 Views    (Results Pending)          Critical care was not performed.     Medical Decision Making  The patient's presentation was of low complexity (an acute and uncomplicated illness or injury).    The patient's evaluation involved:  ordering and/or review of 3+ test(s) in this encounter (see separate area of note for details)    The patient's management necessitated only low risk treatment.      Assessment & Plan    MDM  Patient presenting with a week of mainly rhinorrhea, pharyngitis, cough.  He appears quite well and nontoxic.  Low suspicion for peritonsillar abscess, retropharyngeal abscess, Ludewig's angina, epiglottitis based on history and physical exam here in the emergency department.  He is eating, drinking and phonating without difficulty.  Influenza, strep and COVID are negative.  Chest x-ray is clear.  It sounds like the sore throat is a thing is bothering him the most that he is given a dose of Decadron here.  No indication for antibiotics today.  He does not per primary care provider, will have him follow-up with 1.   Also discussed return precautions.  We will suspicion for other etiologies such as cardiopulmonary disease due to mainly URI symptoms.    I have reviewed the nursing notes. I have reviewed the findings, diagnosis, plan and need for follow up with the patient.    New Prescriptions    No medications on file       Final diagnoses:   Upper respiratory tract infection, unspecified type       Lvie Knox  [facility] EMERGENCY DEPARTMENT  3/17/2023     Live Knox MD  03/17/23 0446

## 2023-03-17 NOTE — DISCHARGE INSTRUCTIONS
Please make an appointment to follow up with [facility] (phone: [phone]) in 2-3 days if not improving.    Return to the emergency department if you are having difficulty eating, drinking, breathing, if you develop worsening pain or if you have any further concerns    If Michael has discomfort from fever or other pain, he can have:  Acetaminophen (Tylenol) every 6 hours as needed. His dose is:    2 regular strength tabs (650 mg)                                     (43.2+ kg/96+ lb)    NOTE: If your acetaminophen (Tylenol) came with a dropper marked with 0.4 and 0.8 ml, call us ([phone]) or check with your doctor about the dose before using it.     AND/OR      Ibuprofen (Advil, Motrin) every 6 hours as needed. His/her dose is:    3 regular strength tabs (600 mg)                                                                         (60-80 kg/132-176 lb)

## 2024-01-13 ENCOUNTER — ANCILLARY PROCEDURE (OUTPATIENT)
Dept: GENERAL RADIOLOGY | Facility: CLINIC | Age: 59
End: 2024-01-13
Attending: FAMILY MEDICINE
Payer: COMMERCIAL

## 2024-01-13 ENCOUNTER — OFFICE VISIT (OUTPATIENT)
Dept: URGENT CARE | Facility: URGENT CARE | Age: 59
End: 2024-01-13
Payer: COMMERCIAL

## 2024-01-13 VITALS
DIASTOLIC BLOOD PRESSURE: 78 MMHG | HEART RATE: 66 BPM | TEMPERATURE: 98.1 F | RESPIRATION RATE: 18 BRPM | BODY MASS INDEX: 28.22 KG/M2 | WEIGHT: 159.3 LBS | SYSTOLIC BLOOD PRESSURE: 134 MMHG | OXYGEN SATURATION: 97 %

## 2024-01-13 DIAGNOSIS — R30.0 DYSURIA: ICD-10-CM

## 2024-01-13 DIAGNOSIS — R31.0 GROSS HEMATURIA: ICD-10-CM

## 2024-01-13 DIAGNOSIS — M54.50 MIDLINE LOW BACK PAIN WITHOUT SCIATICA, UNSPECIFIED CHRONICITY: Primary | ICD-10-CM

## 2024-01-13 LAB
ALBUMIN SERPL BCG-MCNC: 4.3 G/DL (ref 3.5–5.2)
ALBUMIN UR-MCNC: >=300 MG/DL
ALP SERPL-CCNC: 79 U/L (ref 40–150)
ALT SERPL W P-5'-P-CCNC: 18 U/L (ref 0–70)
ANION GAP SERPL CALCULATED.3IONS-SCNC: 11 MMOL/L (ref 7–15)
APPEARANCE UR: ABNORMAL
AST SERPL W P-5'-P-CCNC: 23 U/L (ref 0–45)
BACTERIA #/AREA URNS HPF: ABNORMAL /HPF
BASOPHILS # BLD AUTO: 0.1 10E3/UL (ref 0–0.2)
BASOPHILS NFR BLD AUTO: 1 %
BILIRUB SERPL-MCNC: 2.8 MG/DL
BILIRUB UR QL STRIP: ABNORMAL
BUN SERPL-MCNC: 18.3 MG/DL (ref 8–23)
CALCIUM SERPL-MCNC: 9.1 MG/DL (ref 8.8–10.2)
CHLORIDE SERPL-SCNC: 101 MMOL/L (ref 98–107)
COLOR UR AUTO: YELLOW
CREAT SERPL-MCNC: 1.38 MG/DL (ref 0.67–1.17)
DEPRECATED HCO3 PLAS-SCNC: 25 MMOL/L (ref 22–29)
EGFRCR SERPLBLD CKD-EPI 2021: 59 ML/MIN/1.73M2
EOSINOPHIL # BLD AUTO: 0.1 10E3/UL (ref 0–0.7)
EOSINOPHIL NFR BLD AUTO: 1 %
ERYTHROCYTE [DISTWIDTH] IN BLOOD BY AUTOMATED COUNT: 12.6 % (ref 10–15)
GLUCOSE SERPL-MCNC: 104 MG/DL (ref 70–99)
GLUCOSE UR STRIP-MCNC: NEGATIVE MG/DL
HCT VFR BLD AUTO: 47 % (ref 40–53)
HGB BLD-MCNC: 15.8 G/DL (ref 13.3–17.7)
HGB UR QL STRIP: ABNORMAL
IMM GRANULOCYTES # BLD: 0 10E3/UL
IMM GRANULOCYTES NFR BLD: 0 %
KETONES UR STRIP-MCNC: NEGATIVE MG/DL
LEUKOCYTE ESTERASE UR QL STRIP: NEGATIVE
LYMPHOCYTES # BLD AUTO: 2.4 10E3/UL (ref 0.8–5.3)
LYMPHOCYTES NFR BLD AUTO: 22 %
MCH RBC QN AUTO: 31.5 PG (ref 26.5–33)
MCHC RBC AUTO-ENTMCNC: 33.6 G/DL (ref 31.5–36.5)
MCV RBC AUTO: 94 FL (ref 78–100)
MONOCYTES # BLD AUTO: 1.1 10E3/UL (ref 0–1.3)
MONOCYTES NFR BLD AUTO: 10 %
MUCOUS THREADS #/AREA URNS LPF: PRESENT /LPF
NEUTROPHILS # BLD AUTO: 7.3 10E3/UL (ref 1.6–8.3)
NEUTROPHILS NFR BLD AUTO: 66 %
NITRATE UR QL: NEGATIVE
PH UR STRIP: 5.5 [PH] (ref 5–7)
PLATELET # BLD AUTO: 214 10E3/UL (ref 150–450)
POTASSIUM SERPL-SCNC: 4.4 MMOL/L (ref 3.4–5.3)
PROT SERPL-MCNC: 7.6 G/DL (ref 6.4–8.3)
RBC # BLD AUTO: 5.02 10E6/UL (ref 4.4–5.9)
RBC #/AREA URNS AUTO: >100 /HPF
SODIUM SERPL-SCNC: 137 MMOL/L (ref 135–145)
SP GR UR STRIP: >=1.03 (ref 1–1.03)
SQUAMOUS #/AREA URNS AUTO: ABNORMAL /LPF
UROBILINOGEN UR STRIP-ACNC: 1 E.U./DL
WBC # BLD AUTO: 10.9 10E3/UL (ref 4–11)
WBC #/AREA URNS AUTO: ABNORMAL /HPF

## 2024-01-13 PROCEDURE — 81001 URINALYSIS AUTO W/SCOPE: CPT | Performed by: FAMILY MEDICINE

## 2024-01-13 PROCEDURE — 36415 COLL VENOUS BLD VENIPUNCTURE: CPT | Performed by: FAMILY MEDICINE

## 2024-01-13 PROCEDURE — 72100 X-RAY EXAM L-S SPINE 2/3 VWS: CPT | Mod: TC | Performed by: PREVENTIVE MEDICINE

## 2024-01-13 PROCEDURE — 99203 OFFICE O/P NEW LOW 30 MIN: CPT | Performed by: FAMILY MEDICINE

## 2024-01-13 PROCEDURE — 85025 COMPLETE CBC W/AUTO DIFF WBC: CPT | Performed by: FAMILY MEDICINE

## 2024-01-13 PROCEDURE — 80053 COMPREHEN METABOLIC PANEL: CPT | Performed by: FAMILY MEDICINE

## 2024-01-13 ASSESSMENT — PAIN SCALES - GENERAL: PAINLEVEL: EXTREME PAIN (9)

## 2024-01-13 NOTE — PROGRESS NOTES
Michael Nazario is a 58 year old male who comes in today for symptoms related to fall    Fell two days ago at work    Pulling industrial hoses, walking backward, tripped on some object  and fell on back/ buttock    Next day started feeling pain     Worse last night    Hard to urinate yesterday and mainly today    Able to urinate, painful    Otherwise healthy    Some dental issues    Active job in and outside    When moving around pain not as bad    No history of back pain     Fell on concrete    Reddish tinge to urine per patient   Yesterday and today    No history of kidney stone    Had abdominal surgery about 15 years ago ? Possible partial bowel resection?    Pain not real bad now    Works at [employer]    Patient does not have a primary clinic       Full physical not done     Mentation and affect are fine    No tremor of speech or extremity    Patient has some subjective discomfort lower lumber / upper sacral area    No swelling/ discoloration here    Also some mild right costovertebral angle tenderness but more tender in the lower lumbosacral area    Sensation and strength are normal in both lower extremities.  Negative straight leg raising test bilaterally.  Able get up on heels and toes normally.  No pain on axial loading.     Range of motion of back is fair    Abd soft nontender    Of note we checked lumbar xray, normal bones.  Fair amount of stool present on right side    We did check ua and there was large amount of blood        ASSESSMENT / PLAN:  (M54.50) Midline low back pain without sciatica, unspecified chronicity  (primary encounter diagnosis)  Comment: low back strain/ contusion   Plan: XR Lumbar Spine 2/3 Views        Follow up prn symptoms for the back    (R30.0) Dysuria  Comment: the blood in urine is quite concerning.  Patient should have a CT scan.  Unfortunately he does not have a regular clinic he goes to.  Explained he should schedule an appointment for next week in  a primary clinic and they could order appropriate testing like CT etc.  Alternatively he could go to emergency room.  If symptoms get worse, definitely go to emergency room.  He agreed.   Plan: UA with Microscopic reflex to Culture - lab         collect, UA Microscopic with Reflex to Culture             (R31.0) Gross hematuria  Comment: as above.  I clarified several times that patient needs close follow up.   Plan: CBC with Platelets & Differential,         Comprehensive metabolic panel               I reviewed the patient's medications, allergies, medical history, family history, and social history.    Sergio Morales MD    We did also add cbc , which is normal  Cmp pending; want to make sure kidney function okay.  Sergio Morales MD

## 2024-01-13 NOTE — LETTER
January 15, 2024    Michael Nazario  [address]  [place] [address]          Dear ,    We are writing to inform you of your test results.  Kidney test ( creatinine ) elevated.  Be sure to follow up in clinic in the next few days.   If symptoms get worse, go to emergency room.       Resulted Orders   UA with Microscopic reflex to Culture - lab collect   Result Value Ref Range    Color Urine Yellow Colorless, Straw, Light Yellow, Yellow    Appearance Urine Cloudy (A) Clear    Glucose Urine Negative Negative mg/dL    Bilirubin Urine Small (A) Negative    Ketones Urine Negative Negative mg/dL    Specific Gravity Urine >=1.030 1.003 - 1.035    Blood Urine Large (A) Negative    pH Urine 5.5 5.0 - 7.0    Protein Albumin Urine >=300 (A) Negative mg/dL    Urobilinogen Urine 1.0 0.2, 1.0 E.U./dL    Nitrite Urine Negative Negative    Leukocyte Esterase Urine Negative Negative   UA Microscopic with Reflex to Culture   Result Value Ref Range    Bacteria Urine Moderate (A) None Seen /HPF    RBC Urine >100 (A) 0-2 /HPF /HPF    WBC Urine 5-10 (A) 0-5 /HPF /HPF    Squamous Epithelials Urine Few (A) None Seen /LPF    Mucus Urine Present (A) None Seen /LPF    Narrative    Urine Culture not indicated   Comprehensive metabolic panel   Result Value Ref Range    Sodium 137 135 - 145 mmol/L      Comment:      Reference intervals for this test were updated on 09/26/2023 to more accurately reflect our healthy population. There may be differences in the flagging of prior results with similar values performed with this method. Interpretation of those prior results can be made in the context of the updated reference intervals.     Potassium 4.4 3.4 - 5.3 mmol/L    Carbon Dioxide (CO2) 25 22 - 29 mmol/L    Anion Gap 11 7 - 15 mmol/L    Urea Nitrogen 18.3 8.0 - 23.0 mg/dL    Creatinine 1.38 (H) 0.67 - 1.17 mg/dL    GFR Estimate 59 (L) >60 mL/min/1.73m2    Calcium 9.1 8.8 - 10.2 mg/dL    Chloride 101 98 - 107 mmol/L     Glucose 104 (H) 70 - 99 mg/dL    Alkaline Phosphatase 79 40 - 150 U/L      Comment:      Reference intervals for this test were updated on 11/14/2023 to more accurately reflect our healthy population. There may be differences in the flagging of prior results with similar values performed with this method. Interpretation of those prior results can be made in the context of the updated reference intervals.    AST 23 0 - 45 U/L      Comment:      Reference intervals for this test were updated on 6/12/2023 to more accurately reflect our healthy population. There may be differences in the flagging of prior results with similar values performed with this method. Interpretation of those prior results can be made in the context of the updated reference intervals.    ALT 18 0 - 70 U/L      Comment:      Reference intervals for this test were updated on 6/12/2023 to more accurately reflect our healthy population. There may be differences in the flagging of prior results with similar values performed with this method. Interpretation of those prior results can be made in the context of the updated reference intervals.      Protein Total 7.6 6.4 - 8.3 g/dL    Albumin 4.3 3.5 - 5.2 g/dL    Bilirubin Total 2.8 (H) <=1.2 mg/dL   CBC with platelets and differential   Result Value Ref Range    WBC Count 10.9 4.0 - 11.0 10e3/uL    RBC Count 5.02 4.40 - 5.90 10e6/uL    Hemoglobin 15.8 13.3 - 17.7 g/dL    Hematocrit 47.0 40.0 - 53.0 %    MCV 94 78 - 100 fL    MCH 31.5 26.5 - 33.0 pg    MCHC 33.6 31.5 - 36.5 g/dL    RDW 12.6 10.0 - 15.0 %    Platelet Count 214 150 - 450 10e3/uL    % Neutrophils 66 %    % Lymphocytes 22 %    % Monocytes 10 %    % Eosinophils 1 %    % Basophils 1 %    % Immature Granulocytes 0 %    Absolute Neutrophils 7.3 1.6 - 8.3 10e3/uL    Absolute Lymphocytes 2.4 0.8 - 5.3 10e3/uL    Absolute Monocytes 1.1 0.0 - 1.3 10e3/uL    Absolute Eosinophils 0.1 0.0 - 0.7 10e3/uL    Absolute Basophils 0.1 0.0 - 0.2 10e3/uL     Absolute Immature Granulocytes 0.0 <=0.4 10e3/uL       If you have any questions or concerns, please call the clinic at the number listed above.       Sincerely,      Sergio Morales MD

## 2024-01-13 NOTE — PATIENT INSTRUCTIONS
Increase fluid intake    We will try to send you lab results    You definitely need to follow up soon, either in regular clinic or to emergency room    CT scan of abdomen and pelvis would be helpful.  Need to figure out why you have blood in urine.    Schedule an appointment to be seen in clinic early next week, but if symptoms worsen, go to hospital emergency room.

## 2024-01-14 NOTE — RESULT ENCOUNTER NOTE
Kidney test ( creatinine ) elevated.  Be sure to follow up in clinic in the next few days.   If symptoms get worse, go to emergency room.  Sergio Morales MD

## 2024-03-19 NOTE — ED PROVIDER NOTES
History     Chief Complaint   Patient presents with     Cough     sore throat cough for two weeks     HPI  Michael Nazario is a 55 year old otherwise healthy male who presents to the emergency department with a chief complaint of cough.  The patient reports he has an associated sore throat, headache, dry eyes.  Symptoms have been present for 2 weeks.  Cough is productive of green sputum.  The patient has tried Tylenol, aspirin, and cough medicine at home without relief.  Patient is afebrile on arrival to the emergency department. NKDA.  The patient reports he had pneumonia last year.  No recent antibiotics.  No recent sick contacts.  The patient did travel to [place] in [facility] for work recently.  He also traveled to [facility], but this was after he became ill.    I have reviewed the Medications, Allergies, Past Medical and Surgical History, and Social History in the Epic system.    History reviewed. No pertinent past medical history.  History reviewed. No pertinent surgical history.  No current facility-administered medications for this encounter.     No current outpatient medications on file.     No Known Allergies  Past medical history, past surgical history, medications, and allergies were reviewed with the patient. Additional pertinent items: None    Social History     Socioeconomic History     Marital status: Single     Spouse name: Not on file     Number of children: Not on file     Years of education: Not on file     Highest education level: Not on file   Occupational History     Not on file   Tobacco Use     Smoking status: Never Smoker     Smokeless tobacco: Never Used   Substance and Sexual Activity     Alcohol use: Yes     Alcohol/week: 2.0 standard drinks     Types: 2 Cans of beer per week     Drug use: No     Sexual activity: Not on file   Other Topics Concern     Not on file   Social History Narrative     Not on file     Social Determinants of Health     Financial Resource Strain:       Difficulty of Paying Living Expenses:    Food Insecurity:      Worried About Running Out of Food in the Last Year:      Ran Out of Food in the Last Year:    Transportation Needs:      Lack of Transportation (Medical):      Lack of Transportation (Non-Medical):    Physical Activity:      Days of Exercise per Week:      Minutes of Exercise per Session:    Stress:      Feeling of Stress :    Social Connections:      Frequency of Communication with Friends and Family:      Frequency of Social Gatherings with Friends and Family:      Attends [employer] Services:      Active Member of Clubs or Organizations:      Attends Club or Organization Meetings:      Marital Status:    Intimate Partner Violence:      Fear of Current or Ex-Partner:      Emotionally Abused:      Physically Abused:      Sexually Abused:      Social history was reviewed with the patient. Additional pertinent items: None    Review of Systems  General: No fevers or chills  Skin: No rash or diaphoresis  Eyes: No eye redness or discharge, positive for dry eyes  Ears/Nose/Throat: No rhinorrhea or nasal congestion, positive for sore throat  Respiratory: Positive for cough, no SOB  Cardiovascular: No chest pain or palpitations  Gastrointestinal: No nausea, vomiting, or diarrhea  Genitourinary: No urinary frequency, hematuria, or dysuria  Musculoskeletal: No arthralgias or myalgias  Neurologic: No numbness or weakness, positive for headache  Hematologic/Lymphatic/Immunologic: No leg swelling, no easy bruising/bleeding  Endocrine: No polyuria/polydypsia    A complete review of systems was performed with pertinent positives and negatives noted in the HPI, and all other systems negative.    Physical Exam   BP: (!) 142/82  Pulse: 83  Temp: 98.1  F (36.7  C)  Resp: 18  SpO2: 95 %      General: Well nourished, well developed, NAD  HEENT: EOMI, anicteric. NCAT, MMM  Neck: no jugular venous distension, supple, nl ROM  Cardiac: Regular rate and rhythm. No murmurs,  rubs, or gallops. Normal S1, S2.  Intact peripheral pulses  Pulm: CTAB, no stridor, wheezes, rales, rhonchi, occasional cough noted  Skin: Warm and dry to the touch.  No rash  Extremities: No LE edema, no cyanosis, w/w/p  Neuro: A&Ox3, no gross focal deficits    ED Course        Procedures                          Labs Ordered and Resulted from Time of ED Arrival Up to the Time of Departure from the ED   STREPTOCOCCUS A RAPID SCREEN W REFELX TO PCR - Normal   SARS-COV2 (COVID-19) VIRUS RT-PCR   COVID-19 VIRUS (CORONAVIRUS) BY PCR    Narrative:     The following orders were created for panel order Symptomatic COVID-19 Virus (Coronavirus) by PCR Nasopharyngeal.  Procedure                               Abnormality         Status                     ---------                               -----------         ------                     SARS-COV2 (COVID-19) Vir...[[id number]]                      In process                   Please view results for these tests on the individual orders.   GROUP A STREPTOCOCCUS PCR THROAT SWAB            Results for orders placed or performed during the hospital encounter of 07/12/21 (from the past 24 hour(s))   Symptomatic COVID-19 Virus (Coronavirus) by PCR Nasopharyngeal    Specimen: Nasopharyngeal; Swab    Narrative    The following orders were created for panel order Symptomatic COVID-19 Virus (Coronavirus) by PCR Nasopharyngeal.  Procedure                               Abnormality         Status                     ---------                               -----------         ------                     SARS-COV2 (COVID-19) Vir...[[id number]]                      In process                   Please view results for these tests on the individual orders.   Streptococcus A Rapid Scr w Reflx to PCR    Specimen: Throat; Swab   Result Value Ref Range    Group A Strep antigen Negative Negative   XR Chest Port 1 View    Narrative    XR CHEST PORT 1 VIEW 7/12/2021 8:45 AM    HISTORY:  cough    COMPARISON: 1/8/2018      Impression    IMPRESSION: No focal infiltrate, pleural effusion or pneumothorax. The  cardiac and mediastinal silhouettes are normal.    JENY HA MD         SYSTEM ID:  [id number]       Labs, vital signs, and imaging studies were reviewed by me.    Medications - No data to display    Assessments & Plan (with Medical Decision Making)   Michael Nazario is a 55 year old male who presents to the emergency department with chief complaint of sore throat and cough.  Differential diagnosis includes strep, pneumonia, bronchitis, influenza, covid-19 infection, other viral syndrome.  Strep, Covid testing, chest x-ray were ordered to further evaluate the patient.    Strep testing is negative.    Covid testing is pending.    CXR shows no acute disease process    I have reviewed the nursing notes.    I have reviewed the findings, diagnosis, plan and need for follow up with the patient.    Patient to be discharged home. Advised to follow up with PCP within 1 week. To return to ER immediately with any new/worsening symptoms. Plan of care discussed with patient who expresses understanding and agrees with plan of care.  We will plan to treat with antibiotics given duration of symptoms raising suspicion for bacterial bronchitis.      New Prescriptions    AZITHROMYCIN (ZITHROMAX) 250 MG TABLET    Take 2 tablets (500 mg) by mouth daily for 1 day, THEN 1 tablet (250 mg) daily for 4 days.       Final diagnoses:   Cough   Sore throat   Bronchitis     Renetta Arambula MD  7/12/2021   [facility] EMERGENCY DEPARTMENT     Renetta Arambula MD  07/12/21 0909     [place]